# Patient Record
Sex: MALE | Race: WHITE | Employment: FULL TIME | URBAN - METROPOLITAN AREA
[De-identification: names, ages, dates, MRNs, and addresses within clinical notes are randomized per-mention and may not be internally consistent; named-entity substitution may affect disease eponyms.]

---
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
## 2022-06-15 ENCOUNTER — HOSPITAL ENCOUNTER (EMERGENCY)
Age: 22
Discharge: HOME | End: 2022-06-15
Attending: EMERGENCY MEDICINE
Payer: COMMERCIAL

## 2022-06-15 ENCOUNTER — APPOINTMENT (OUTPATIENT)
Dept: CT IMAGING | Age: 22
End: 2022-06-15
Payer: COMMERCIAL

## 2022-06-15 VITALS
SYSTOLIC BLOOD PRESSURE: 120 MMHG | BODY MASS INDEX: 21.41 KG/M2 | DIASTOLIC BLOOD PRESSURE: 80 MMHG | WEIGHT: 145 LBS | RESPIRATION RATE: 16 BRPM | OXYGEN SATURATION: 100 % | HEART RATE: 80 BPM | TEMPERATURE: 99.8 F

## 2022-06-15 DIAGNOSIS — B27.90 INFECTIOUS MONONUCLEOSIS WITHOUT COMPLICATION, INFECTIOUS MONONUCLEOSIS DUE TO UNSPECIFIED ORGANISM: Primary | ICD-10-CM

## 2022-06-15 LAB
A/G RATIO: 1.9 (ref 1.1–2.2)
ALBUMIN SERPL-MCNC: 4.8 G/DL (ref 3.4–5)
ALP BLD-CCNC: 156 U/L (ref 40–129)
ALT SERPL-CCNC: 84 U/L (ref 10–40)
ANION GAP SERPL CALCULATED.3IONS-SCNC: 12 MMOL/L (ref 3–16)
ANISOCYTOSIS: ABNORMAL
AST SERPL-CCNC: 89 U/L (ref 15–37)
ATYPICAL LYMPHOCYTE RELATIVE PERCENT: 34 % (ref 0–6)
BANDED NEUTROPHILS RELATIVE PERCENT: 19 % (ref 0–7)
BASOPHILS ABSOLUTE: 0.1 K/UL (ref 0–0.2)
BASOPHILS RELATIVE PERCENT: 1 %
BILIRUB SERPL-MCNC: 0.8 MG/DL (ref 0–1)
BUN BLDV-MCNC: 18 MG/DL (ref 7–20)
CALCIUM SERPL-MCNC: 9.8 MG/DL (ref 8.3–10.6)
CHLORIDE BLD-SCNC: 99 MMOL/L (ref 99–110)
CO2: 28 MMOL/L (ref 21–32)
CREAT SERPL-MCNC: 1.2 MG/DL (ref 0.9–1.3)
EOSINOPHILS ABSOLUTE: 0 K/UL (ref 0–0.6)
EOSINOPHILS RELATIVE PERCENT: 0 %
GFR AFRICAN AMERICAN: >60
GFR NON-AFRICAN AMERICAN: >60
GLUCOSE BLD-MCNC: 94 MG/DL (ref 70–99)
HCT VFR BLD CALC: 46 % (ref 40.5–52.5)
HEMATOLOGY PATH CONSULT: YES
HEMOGLOBIN: 15.6 G/DL (ref 13.5–17.5)
LIPASE: 28 U/L (ref 13–60)
LYMPHOCYTES ABSOLUTE: 7.3 K/UL (ref 1–5.1)
LYMPHOCYTES RELATIVE PERCENT: 23 %
MACROCYTES: ABNORMAL
MCH RBC QN AUTO: 30 PG (ref 26–34)
MCHC RBC AUTO-ENTMCNC: 33.9 G/DL (ref 31–36)
MCV RBC AUTO: 88.3 FL (ref 80–100)
MONO TEST: POSITIVE
MONOCYTES ABSOLUTE: 1 K/UL (ref 0–1.3)
MONOCYTES RELATIVE PERCENT: 8 %
NEUTROPHILS ABSOLUTE: 4.4 K/UL (ref 1.7–7.7)
NEUTROPHILS RELATIVE PERCENT: 15 %
PDW BLD-RTO: 12.8 % (ref 12.4–15.4)
PLATELET # BLD: 152 K/UL (ref 135–450)
PLATELET SLIDE REVIEW: ADEQUATE
PMV BLD AUTO: 8.1 FL (ref 5–10.5)
POIKILOCYTES: ABNORMAL
POLYCHROMASIA: ABNORMAL
POTASSIUM REFLEX MAGNESIUM: 4.6 MMOL/L (ref 3.5–5.1)
RAPID INFLUENZA  B AGN: NEGATIVE
RAPID INFLUENZA A AGN: NEGATIVE
RBC # BLD: 5.21 M/UL (ref 4.2–5.9)
SARS-COV-2, NAAT: NOT DETECTED
SLIDE REVIEW: ABNORMAL
SODIUM BLD-SCNC: 139 MMOL/L (ref 136–145)
SPHEROCYTES: ABNORMAL
TOTAL PROTEIN: 7.3 G/DL (ref 6.4–8.2)
WBC # BLD: 12.8 K/UL (ref 4–11)

## 2022-06-15 PROCEDURE — 86308 HETEROPHILE ANTIBODY SCREEN: CPT

## 2022-06-15 PROCEDURE — 83690 ASSAY OF LIPASE: CPT

## 2022-06-15 PROCEDURE — 87804 INFLUENZA ASSAY W/OPTIC: CPT

## 2022-06-15 PROCEDURE — 85025 COMPLETE CBC W/AUTO DIFF WBC: CPT

## 2022-06-15 PROCEDURE — 74176 CT ABD & PELVIS W/O CONTRAST: CPT

## 2022-06-15 PROCEDURE — 99284 EMERGENCY DEPT VISIT MOD MDM: CPT

## 2022-06-15 PROCEDURE — 6370000000 HC RX 637 (ALT 250 FOR IP): Performed by: EMERGENCY MEDICINE

## 2022-06-15 PROCEDURE — 87635 SARS-COV-2 COVID-19 AMP PRB: CPT

## 2022-06-15 PROCEDURE — 80053 COMPREHEN METABOLIC PANEL: CPT

## 2022-06-15 RX ORDER — ONDANSETRON 2 MG/ML
4 INJECTION INTRAMUSCULAR; INTRAVENOUS ONCE
Status: DISCONTINUED | OUTPATIENT
Start: 2022-06-15 | End: 2022-06-15

## 2022-06-15 RX ORDER — ONDANSETRON 4 MG/1
4 TABLET, ORALLY DISINTEGRATING ORAL ONCE
Status: COMPLETED | OUTPATIENT
Start: 2022-06-15 | End: 2022-06-15

## 2022-06-15 RX ORDER — ONDANSETRON 4 MG/1
4 TABLET, ORALLY DISINTEGRATING ORAL EVERY 8 HOURS PRN
Qty: 60 TABLET | Refills: 0 | Status: SHIPPED | OUTPATIENT
Start: 2022-06-15 | End: 2022-07-05

## 2022-06-15 RX ADMIN — ONDANSETRON 4 MG: 4 TABLET, ORALLY DISINTEGRATING ORAL at 17:41

## 2022-06-15 ASSESSMENT — PAIN DESCRIPTION - LOCATION: LOCATION: ABDOMEN

## 2022-06-15 ASSESSMENT — PAIN SCALES - GENERAL: PAINLEVEL_OUTOF10: 6

## 2022-06-15 ASSESSMENT — PAIN - FUNCTIONAL ASSESSMENT: PAIN_FUNCTIONAL_ASSESSMENT: 0-10

## 2022-06-15 NOTE — ED PROVIDER NOTES
I independently performed a history and physical on [facility]. All diagnostic, treatment, and disposition decisions were made by myself in conjunction with the advanced practice provider. For further details of [facility] emergency department encounter, please see ANDERSON Duran's documentation. The substantive of portion of the MDM was performed by myself      Patient presents to the emergency department complaining of nausea and abdominal pain/cramping. Patient reports that approximately 2 weeks ago while \" heading out to the bars\" he noted sudden onset nausea and left upper quadrant pain. Patient reports anorexia since that time and states that he has lost approximately 10 pounds. He denies known sick contacts, diarrhea, or constipation. Physical exam: General: No acute distress. Head: Normocephalic/atraumatic. Heart: Regular rate rhythm. Normal S1-S2. Lungs: Clear to auscultation bilaterally. No wheeze, rales, rhonchi. Abdomen: Soft. Nontender, nondistended. No rebound, guarding. Normal bowel sounds. No CVA tenderness. Assessment/plan:   1.  Infectious mononucleosis without complication, infectious mononucleosis due to unspecified organism               Candi Aleman DO  06/15/22 1084

## 2022-06-15 NOTE — ED PROVIDER NOTES
[facility] Emergency Department    CHIEF COMPLAINT  Abdominal Pain (intermittent abdominal pain for two weeks.), Nausea, and Anorexia (unable to eat x 2 weeks, lost 8-10lbs.)      SHARED SERVICE VISIT  I have seen and evaluated this patient with my supervising physician, Dr. Belvie Najjar. HISTORY OF PRESENT ILLNESS  Rosemary Vega is a 24 y.o. male who presents to the ED complaining of abdominal pain, headache, nausea, and anorexia for 2 weeks. Patient states that over the course of 2 weeks he has been unable to eat food but is able to tolerate liquids. Patient states that he has lost 8 to 10 pounds in this time period. Patient states he is experiencing intermittent left upper quadrant abdominal pain. Patient states that he is also been experiencing fever but has been unable to take his temperature. Patient is also complaining of dental pain during this time period. Patient was evaluated over at urgent care this morning and was referred to the ED for further evaluation. Patient denies any chills or body aches. Patient denies any coughing, sneezing, or sore throat. Patient denies any chest pain, shortness of breath, or dyspnea on exertion. Patient denies any urinary symptoms. Patient denies any diarrhea. Patient denies any recent travel or sick contacts. No other complaints, modifying factors or associated symptoms. Nursing notes reviewed. History reviewed. No pertinent past medical history. History reviewed. No pertinent surgical history. History reviewed. No pertinent family history.   Social History     Socioeconomic History    Marital status: Single     Spouse name: Not on file    Number of children: Not on file    Years of education: Not on file    Highest education level: Not on file   Occupational History    Not on file   Tobacco Use    Smoking status: Never Smoker    Smokeless tobacco: Never Used   Vaping Use    Vaping Use: Never used Substance and Sexual Activity    Alcohol use: Yes     Comment: socially    Drug use: No    Sexual activity: Not Currently   Other Topics Concern    Not on file   Social History Narrative    Not on file     Social Determinants of Health     Financial Resource Strain:     Difficulty of Paying Living Expenses: Not on file   Food Insecurity:     Worried About Running Out of Food in the Last Year: Not on file    Anibal of Food in the Last Year: Not on file   Transportation Needs:     Lack of Transportation (Medical): Not on file    Lack of Transportation (Non-Medical): Not on file   Physical Activity:     Days of Exercise per Week: Not on file    Minutes of Exercise per Session: Not on file   Stress:     Feeling of Stress : Not on file   Social Connections:     Frequency of Communication with Friends and Family: Not on file    Frequency of Social Gatherings with Friends and Family: Not on file    Attends [employer] Services: Not on file    Active Member of [address] [place] [address] or Organizations: Not on file    Attends Club or Organization Meetings: Not on file    Marital Status: Not on file   Intimate Partner Violence:     Fear of Current or Ex-Partner: Not on file    Emotionally Abused: Not on file    Physically Abused: Not on file    Sexually Abused: Not on file   Housing Stability:     Unable to Pay for Housing in the Last Year: Not on file    Number of [place] in the Last Year: Not on file    Unstable Housing in the Last Year: Not on file     No current facility-administered medications for this encounter. No current outpatient medications on file. No Known Allergies    REVIEW OF SYSTEMS  10 systems reviewed, pertinent positives per HPI otherwise noted to be negative    PHYSICAL EXAM  /77   Pulse 84   Temp 99.8 °F (37.7 °C) (Oral)   Resp 17   Wt 145 lb (65.8 kg)   SpO2 99%   BMI 21.41 kg/m²   GENERAL APPEARANCE: Awake and alert. Cooperative. No acute distress.   HEAD: Normocephalic. Atraumatic. EYES: PERRL. EOM's grossly intact. ENT: Tenderness palpation in the gumline of the mandible around tooth 13. Edema and erythema noted. No erythema, edema, discharge noted at the external auditory canals. TMs translucent. No bulging or fluid noted behind the TMs. No erythema or exudate noted on tonsils. No petechia, erythema, or edema noted in the throat. Mucous membranes are pink and moist.   NECK: Supple. HEART: RRR. No murmurs. LUNGS: Respirations unlabored. CTAB. Good air exchange. Speaking comfortably in full sentences. ABDOMEN: Tenderness to palpation of the left upper quadrant. Soft. Non-distended. No cells present within quadrants. No guarding or rebound. No masses. No McBurney's point tenderness. Negative Rovsing sign. Negative psoas sign. Negative obturator sign. EXTREMITIES: No peripheral edema. Moves all extremities equally. All extremities neurovascularly intact. SKIN: Warm and dry. No acute rashes. NEUROLOGICAL: Alert and oriented. CN's 2-12 intact. No gross facial drooping. Strength 5/5, sensation intact. PSYCHIATRIC: Normal mood and affect. RADIOLOGY  CT performed in the ED shows moderate splenomegaly with small quantity of adjacent free fluid. Mild retained stool throughout the colon. Lites unremarkable noncontrast CT of the abdomen and pelvis. No bowel pathology. No evidence of obstructive uropathy. ED COURSE  26-year-old male presents to the ED complaining of nausea, abdominal pain and anorexia for 2 weeks. Patient stated that he has been experiencing intermittent fevers and has lost 8 to 10 pounds over the past 2 weeks. Upon physical exam patient had tenderness to left upper quadrant. No pulsatile masses were palpated abdomen was soft and nondistended. No guarding or rigidity noted. No McBurney's point tenderness. Negative Rovsing sign, obturator sign and negative psoas sign.   Noncontrast CT performed in the ER showed moderate splenomegaly with small quantity of adjacent free fluid. Rapid COVID and flu tests were negative. Leukocytosis noted on CBC. CMP showed elevated alk phos as well as ALT and AST. Mononucleosis screen was positive. Patient received Zofran for nausea, with good relief. Triage vitals /77, pulse 84, respirations 17, temperature 99.8 °F.  Risk management discussed and shared decision making had with patient and/or surrogate. All questions were answered. Patient will follow up with primary care for further evaluation/treatment. All questions answered. Patient will return to ED for new/worsening symptoms. Patient was sent home with a prescription for Zofran for nausea. CRITICAL CARE TIME  0 minutes of critical care time spent not including separately billable procedures.     MDM  Results for orders placed or performed during the hospital encounter of 06/15/22   COVID-19, Rapid    Specimen: Nasopharyngeal Swab   Result Value Ref Range    SARS-CoV-2, NAAT Not Detected Not Detected   Rapid influenza A/B antigens    Specimen: Nasopharyngeal   Result Value Ref Range    Rapid Influenza A Ag Negative Negative    Rapid Influenza B Ag Negative Negative   CBC with Auto Differential   Result Value Ref Range    WBC 12.8 (H) 4.0 - 11.0 K/uL    RBC 5.21 4.20 - 5.90 M/uL    Hemoglobin 15.6 13.5 - 17.5 g/dL    Hematocrit 46.0 40.5 - 52.5 %    MCV 88.3 80.0 - 100.0 fL    MCH 30.0 26.0 - 34.0 pg    MCHC 33.9 31.0 - 36.0 g/dL    RDW 12.8 12.4 - 15.4 %    Platelets 529 176 - 199 K/uL    MPV 8.1 5.0 - 10.5 fL    PLATELET SLIDE REVIEW Adequate     SLIDE REVIEW see below     Path Consult Yes     Neutrophils % 15.0 %    Lymphocytes % 23.0 %    Monocytes % 8.0 %    Eosinophils % 0.0 %    Basophils % 1.0 %    Neutrophils Absolute 4.4 1.7 - 7.7 K/uL    Lymphocytes Absolute 7.3 (H) 1.0 - 5.1 K/uL    Monocytes Absolute 1.0 0.0 - 1.3 K/uL    Eosinophils Absolute 0.0 0.0 - 0.6 K/uL    Basophils Absolute 0.1 0.0 - 0.2 K/uL    Bands Relative 19 (H) 0 - 7 %    Atypical Lymphocytes Relative 34 (H) 0 - 6 %    Anisocytosis Occasional (A)     Macrocytes Occasional (A)     Polychromasia Occasional (A)     Poikilocytes Occasional (A)     Spherocytes Occasional (A)    Comprehensive Metabolic Panel w/ Reflex to MG   Result Value Ref Range    Sodium 139 136 - 145 mmol/L    Potassium reflex Magnesium 4.6 3.5 - 5.1 mmol/L    Chloride 99 99 - 110 mmol/L    CO2 28 21 - 32 mmol/L    Anion Gap 12 3 - 16    Glucose 94 70 - 99 mg/dL    BUN 18 7 - 20 mg/dL    CREATININE 1.2 0.9 - 1.3 mg/dL    GFR Non-African American >60 >60    GFR African American >60 >60    Calcium 9.8 8.3 - 10.6 mg/dL    Total Protein 7.3 6.4 - 8.2 g/dL    Albumin 4.8 3.4 - 5.0 g/dL    Albumin/Globulin Ratio 1.9 1.1 - 2.2    Total Bilirubin 0.8 0.0 - 1.0 mg/dL    Alkaline Phosphatase 156 (H) 40 - 129 U/L    ALT 84 (H) 10 - 40 U/L    AST 89 (H) 15 - 37 U/L   Lipase   Result Value Ref Range    Lipase 28.0 13.0 - 60.0 U/L   Mononucleosis screen   Result Value Ref Range    Mono Test POSITIVE (A) Negative         I estimate there is LOW risk for ACUTE APPENDICITIS, BOWEL OBSTRUCTION, CHOLECYSTITIS, DIVERTICULITIS, INCARCERATED HERNIA, PANCREATITIS, or PERFORATED BOWEL or ULCER, thus I consider the discharge disposition reasonable. Also, there is no evidence or peritonitis, sepsis, or toxicity. Betty Beavers and I have discussed the diagnosis and risks, and we agree with discharging home to follow-up with their primary doctor. We also discussed returning to the Emergency Department immediately if new or worsening symptoms occur. We have discussed the symptoms which are most concerning (e.g., bloody stool, fever, changing or worsening pain, vomiting) that necessitate immediate return. FINAL Impression    1.  Infectious mononucleosis without complication, infectious mononucleosis due to unspecified organism        Blood pressure 120/80, pulse 80, temperature 99.8 °F (37.7 °C), temperature source Oral, resp. rate 16, weight 145 lb (65.8 kg), SpO2 100 %. DISPOSITION  Patient was discharged to home in good condition.          Gus Worley PA-C  06/15/22 2001

## 2022-06-17 LAB — HEMATOLOGY PATH CONSULT: NORMAL

## 2023-03-07 ENCOUNTER — HOSPITAL ENCOUNTER (OUTPATIENT)
Dept: CT IMAGING | Age: 23
Discharge: HOME | End: 2023-03-07
Payer: COMMERCIAL

## 2023-03-07 DIAGNOSIS — M25.571 PAIN IN RIGHT ANKLE AND JOINTS OF RIGHT FOOT: ICD-10-CM

## 2023-03-07 PROCEDURE — 73700 CT LOWER EXTREMITY W/O DYE: CPT

## 2023-03-07 PROCEDURE — 76377 3D RENDER W/INTRP POSTPROCES: CPT
